# Patient Record
Sex: FEMALE | Race: WHITE | Employment: UNEMPLOYED | URBAN - NONMETROPOLITAN AREA
[De-identification: names, ages, dates, MRNs, and addresses within clinical notes are randomized per-mention and may not be internally consistent; named-entity substitution may affect disease eponyms.]

---

## 2017-03-10 ENCOUNTER — OFFICE VISIT (OUTPATIENT)
Dept: FAMILY MEDICINE CLINIC | Age: 32
End: 2017-03-10

## 2017-03-10 VITALS
SYSTOLIC BLOOD PRESSURE: 116 MMHG | BODY MASS INDEX: 27.13 KG/M2 | HEIGHT: 71 IN | RESPIRATION RATE: 20 BRPM | DIASTOLIC BLOOD PRESSURE: 72 MMHG | HEART RATE: 68 BPM | WEIGHT: 193.8 LBS | TEMPERATURE: 97.4 F

## 2017-03-10 DIAGNOSIS — F32.A DEPRESSION, UNSPECIFIED DEPRESSION TYPE: Primary | ICD-10-CM

## 2017-03-10 PROCEDURE — 99213 OFFICE O/P EST LOW 20 MIN: CPT | Performed by: NURSE PRACTITIONER

## 2017-03-10 RX ORDER — ASPIRIN 81 MG/1
81 TABLET, CHEWABLE ORAL DAILY
COMMUNITY
End: 2019-09-18

## 2017-03-10 RX ORDER — CITALOPRAM 20 MG/1
20 TABLET ORAL DAILY
Qty: 30 TABLET | Refills: 6 | Status: SHIPPED | OUTPATIENT
Start: 2017-03-10

## 2017-03-10 ASSESSMENT — ENCOUNTER SYMPTOMS
SHORTNESS OF BREATH: 0
WHEEZING: 0

## 2019-09-18 ENCOUNTER — HOSPITAL ENCOUNTER (OUTPATIENT)
Age: 34
Setting detail: SPECIMEN
Discharge: HOME | End: 2019-09-18

## 2019-09-18 ENCOUNTER — OFFICE VISIT (OUTPATIENT)
Dept: PRIMARY CARE CLINIC | Age: 34
End: 2019-09-18

## 2019-09-18 VITALS
WEIGHT: 174 LBS | OXYGEN SATURATION: 100 % | DIASTOLIC BLOOD PRESSURE: 74 MMHG | HEART RATE: 78 BPM | HEIGHT: 71 IN | TEMPERATURE: 98.1 F | BODY MASS INDEX: 24.36 KG/M2 | RESPIRATION RATE: 16 BRPM | SYSTOLIC BLOOD PRESSURE: 110 MMHG

## 2019-09-18 DIAGNOSIS — R59.1 LYMPHADENOPATHY: Primary | ICD-10-CM

## 2019-09-18 DIAGNOSIS — R59.1 LYMPHADENOPATHY: ICD-10-CM

## 2019-09-18 LAB
ABSOLUTE EOS #: 0.3 K/UL (ref 0–0.4)
ABSOLUTE IMMATURE GRANULOCYTE: NORMAL K/UL (ref 0–0.3)
ABSOLUTE LYMPH #: 2.5 K/UL (ref 1–4.8)
ABSOLUTE MONO #: 0.7 K/UL (ref 0.1–1.2)
ANION GAP SERPL CALCULATED.3IONS-SCNC: 13 MMOL/L (ref 9–17)
BASOPHILS # BLD: 1 % (ref 0–1)
BASOPHILS ABSOLUTE: 0 K/UL (ref 0–0.2)
BUN BLDV-MCNC: 15 MG/DL (ref 6–20)
BUN/CREAT BLD: 25 (ref 9–20)
CALCIUM SERPL-MCNC: 9.2 MG/DL (ref 8.6–10.4)
CHLORIDE BLD-SCNC: 98 MMOL/L (ref 98–107)
CO2: 27 MMOL/L (ref 20–31)
CREAT SERPL-MCNC: 0.61 MG/DL (ref 0.5–0.9)
DIFFERENTIAL TYPE: NORMAL
EOSINOPHILS RELATIVE PERCENT: 4 % (ref 1–7)
GFR AFRICAN AMERICAN: >60 ML/MIN
GFR NON-AFRICAN AMERICAN: >60 ML/MIN
GFR SERPL CREATININE-BSD FRML MDRD: ABNORMAL ML/MIN/{1.73_M2}
GFR SERPL CREATININE-BSD FRML MDRD: ABNORMAL ML/MIN/{1.73_M2}
GLUCOSE BLD-MCNC: 135 MG/DL (ref 70–99)
HCT VFR BLD CALC: 39.9 % (ref 36–46)
HEMOGLOBIN: 14 G/DL (ref 12–16)
IMMATURE GRANULOCYTES: NORMAL %
LYMPHOCYTES # BLD: 37 % (ref 16–46)
MCH RBC QN AUTO: 29.5 PG (ref 26–34)
MCHC RBC AUTO-ENTMCNC: 35 G/DL (ref 31–37)
MCV RBC AUTO: 84.5 FL (ref 80–100)
MONOCYTES # BLD: 10 % (ref 4–11)
MONONUCLEOSIS SCREEN: NEGATIVE
NRBC AUTOMATED: NORMAL PER 100 WBC
PDW BLD-RTO: 12.9 % (ref 11–14.5)
PLATELET # BLD: 256 K/UL (ref 140–450)
PLATELET ESTIMATE: NORMAL
PMV BLD AUTO: 8 FL (ref 6–12)
POTASSIUM SERPL-SCNC: 3.8 MMOL/L (ref 3.7–5.3)
RBC # BLD: 4.73 M/UL (ref 4–5.2)
RBC # BLD: NORMAL 10*6/UL
S PYO AG THROAT QL: NORMAL
SEG NEUTROPHILS: 48 % (ref 43–77)
SEGMENTED NEUTROPHILS ABSOLUTE COUNT: 3.3 K/UL (ref 1.8–7.7)
SODIUM BLD-SCNC: 138 MMOL/L (ref 135–144)
WBC # BLD: 6.7 K/UL (ref 3.5–11)
WBC # BLD: NORMAL 10*3/UL

## 2019-09-18 PROCEDURE — 80048 BASIC METABOLIC PNL TOTAL CA: CPT

## 2019-09-18 PROCEDURE — 36415 COLL VENOUS BLD VENIPUNCTURE: CPT

## 2019-09-18 PROCEDURE — 85025 COMPLETE CBC W/AUTO DIFF WBC: CPT

## 2019-09-18 PROCEDURE — 86308 HETEROPHILE ANTIBODY SCREEN: CPT

## 2019-09-18 PROCEDURE — 87880 STREP A ASSAY W/OPTIC: CPT | Performed by: NURSE PRACTITIONER

## 2019-09-18 PROCEDURE — 99213 OFFICE O/P EST LOW 20 MIN: CPT | Performed by: NURSE PRACTITIONER

## 2019-09-18 ASSESSMENT — ENCOUNTER SYMPTOMS
NAUSEA: 0
RESPIRATORY NEGATIVE: 1
VISUAL CHANGE: 0
SORE THROAT: 0
ABDOMINAL PAIN: 0
COUGH: 0
VOMITING: 0

## 2019-09-19 NOTE — PATIENT INSTRUCTIONS
now or seek immediate medical care if:    · Your lymph nodes get bigger.     · The area becomes red and feels more tender.     · You have a fever that does not go away.    Watch closely for changes in your health, and be sure to contact your doctor if:    · You do not get better as expected. Where can you learn more? Go to [email or web address] and sign in to your [employer] account. Enter S131 in the [employer] box to learn more about \"Lymphadenitis: Care Instructions. \"     If you do not have an account, please click on the \"Sign Up Now\" link. Current as of: July 30, 2018  Content Version: 12.1  © 4221-7570 [employer], [employer]. Care instructions adapted under license by [facility] [place] ([facility] [place]). If you have questions about a medical condition or this instruction, always ask your healthcare professional. [address] any warranty or liability for your use of this information.

## 2019-09-19 NOTE — PROGRESS NOTES
6 - 20 mg/dL Final    CREATININE 09/18/2019 0.61  0.50 - 0.90 mg/dL Final    Bun/Cre Ratio 09/18/2019 25* 9 - 20 Final    Calcium 09/18/2019 9.2  8.6 - 10.4 mg/dL Final    Sodium 09/18/2019 138  135 - 144 mmol/L Final    Potassium 09/18/2019 3.8  3.7 - 5.3 mmol/L Final    Chloride 09/18/2019 98  98 - 107 mmol/L Final    CO2 09/18/2019 27  20 - 31 mmol/L Final    Anion Gap 09/18/2019 13  9 - 17 mmol/L Final    GFR Non- 09/18/2019 >60  >60 mL/min Final    GFR  09/18/2019 >60  >60 mL/min Final    GFR Comment 09/18/2019        Final    Comment: Average GFR for 30-36 years old:   80 mL/min/1.73sq m  Chronic Kidney Disease:   <60 mL/min/1.73sq m  Kidney failure:   <15 mL/min/1.73sq m              eGFR calculated using average adult body mass.  Additional eGFR calculator available at:        [employer]            GFR Staging 09/18/2019 NOT REPORTED   Final    WBC 09/18/2019 6.7  3.5 - 11.0 k/uL Final    RBC 09/18/2019 4.73  4.0 - 5.2 m/uL Final    Hemoglobin 09/18/2019 14.0  12.0 - 16.0 g/dL Final    Hematocrit 09/18/2019 39.9  36 - 46 % Final    MCV 09/18/2019 84.5  80 - 100 fL Final    MCH 09/18/2019 29.5  26 - 34 pg Final    MCHC 09/18/2019 35.0  31 - 37 g/dL Final    RDW 09/18/2019 12.9  11.0 - 14.5 % Final    Platelets 59/29/6945 256  140 - 450 k/uL Final    MPV 09/18/2019 8.0  6.0 - 12.0 fL Final    NRBC Automated 09/18/2019 NOT REPORTED  per 100 WBC Final    Differential Type 09/18/2019 NOT REPORTED   Final    Immature Granulocytes 09/18/2019 NOT REPORTED  0 % Final    Absolute Immature Granulocyte 09/18/2019 NOT REPORTED  0.00 - 0.30 k/uL Final    WBC Morphology 09/18/2019 NOT REPORTED   Final    RBC Morphology 09/18/2019 NOT REPORTED   Final    Platelet Estimate 09/64/2371 NOT REPORTED   Final    Seg Neutrophils 09/18/2019 48  43 - 77 % Final    Lymphocytes 09/18/2019 37  16 - 46 % Final    Monocytes 09/18/2019 10 4 - 11 % Final    Eosinophils % 09/18/2019 4  1 - 7 % Final    Basophils 09/18/2019 1  0 - 1 % Final    Segs Absolute 09/18/2019 3.30  1.8 - 7.7 k/uL Final    Absolute Lymph # 09/18/2019 2.50  1.0 - 4.8 k/uL Final    Absolute Mono # 09/18/2019 0.70  0.1 - 1.2 k/uL Final    Absolute Eos # 09/18/2019 0.30  0.0 - 0.4 k/uL Final    Basophils Absolute 09/18/2019 0.00  0.0 - 0.2 k/uL Final   Office Visit on 09/18/2019   Component Date Value Ref Range Status    Strep A Ag 09/18/2019 None Detected  None Detected Final        Diagnosis Orders   1. Lymphadenopathy  CBC Auto Differential    Basic Metabolic Panel    POCT rapid strep A    Mononucleosis Screen     Take ibuprofen 600mg 2-3 times daily. I recommended antibiotic, patient refused at this time. Increase water intake. Increase rest. Follow up with PCP if symptoms persist. Go to ER if symptoms worsen. Answered all questions. No orders of the defined types were placed in this encounter.         Electronically signed by CUBA Franklin CNP on 9/18/19 at 8:21 PM